# Patient Record
Sex: FEMALE | Race: WHITE | NOT HISPANIC OR LATINO | Employment: OTHER | URBAN - METROPOLITAN AREA
[De-identification: names, ages, dates, MRNs, and addresses within clinical notes are randomized per-mention and may not be internally consistent; named-entity substitution may affect disease eponyms.]

---

## 2017-11-13 DIAGNOSIS — R10.9 AP (ABDOMINAL PAIN): Primary | ICD-10-CM

## 2017-11-21 ENCOUNTER — HOSPITAL ENCOUNTER (OUTPATIENT)
Dept: RADIOLOGY | Facility: HOSPITAL | Age: 27
Discharge: HOME | End: 2017-11-21
Payer: OTHER GOVERNMENT

## 2017-11-21 DIAGNOSIS — R10.9 AP (ABDOMINAL PAIN): ICD-10-CM

## 2017-11-21 PROCEDURE — 25500020 PHARM REV CODE 255

## 2017-11-21 PROCEDURE — 74177 CT ABD & PELVIS W/CONTRAST: CPT | Mod: TC

## 2017-11-21 PROCEDURE — 74177 CT ABD & PELVIS W/CONTRAST: CPT | Mod: 26,,, | Performed by: RADIOLOGY

## 2017-11-21 RX ADMIN — IOHEXOL 15 ML: 300 INJECTION, SOLUTION INTRAVENOUS at 11:11

## 2017-11-21 RX ADMIN — IOHEXOL 75 ML: 350 INJECTION, SOLUTION INTRAVENOUS at 11:11

## 2017-12-15 ENCOUNTER — TELEPHONE (OUTPATIENT)
Dept: OBSTETRICS AND GYNECOLOGY | Facility: CLINIC | Age: 27
End: 2017-12-15

## 2017-12-15 DIAGNOSIS — Z34.90 PREGNANCY, UNSPECIFIED GESTATIONAL AGE: Primary | ICD-10-CM

## 2017-12-15 NOTE — TELEPHONE ENCOUNTER
----- Message from Tiff Posadas sent at 12/15/2017 10:37 AM CST -----  Contact: self    Who Called: self    Date of Positive Preg Test: 12/6    1st day of Last Menstrual Cycle: 11/4    List Any Difficulties: none    What Number to Call Back: [phone]    Pt should expect a return call by the end of the day.

## 2018-01-09 ENCOUNTER — LAB VISIT (OUTPATIENT)
Dept: LAB | Facility: OTHER | Age: 28
End: 2018-01-09
Attending: OBSTETRICS & GYNECOLOGY
Payer: OTHER GOVERNMENT

## 2018-01-09 ENCOUNTER — OFFICE VISIT (OUTPATIENT)
Dept: OBSTETRICS AND GYNECOLOGY | Facility: CLINIC | Age: 28
End: 2018-01-09
Payer: OTHER GOVERNMENT

## 2018-01-09 VITALS
HEIGHT: 67 IN | BODY MASS INDEX: 28.3 KG/M2 | WEIGHT: 180.31 LBS | DIASTOLIC BLOOD PRESSURE: 74 MMHG | SYSTOLIC BLOOD PRESSURE: 106 MMHG

## 2018-01-09 DIAGNOSIS — N91.2 AMENORRHEA: Primary | ICD-10-CM

## 2018-01-09 DIAGNOSIS — Z34.90 PREGNANCY, UNSPECIFIED GESTATIONAL AGE: ICD-10-CM

## 2018-01-09 DIAGNOSIS — N91.1 SECONDARY AMENORRHEA: ICD-10-CM

## 2018-01-09 DIAGNOSIS — Z13.79 ENCOUNTER FOR GENETIC SCREENING FOR DOWN SYNDROME: Primary | ICD-10-CM

## 2018-01-09 DIAGNOSIS — N91.2 AMENORRHEA: ICD-10-CM

## 2018-01-09 DIAGNOSIS — Z36.89 ESTABLISH GESTATIONAL AGE, ULTRASOUND: ICD-10-CM

## 2018-01-09 LAB
ABO + RH BLD: NORMAL
B-HCG UR QL: POSITIVE
BASOPHILS # BLD AUTO: 0.02 K/UL
BASOPHILS NFR BLD: 0.2 %
BLD GP AB SCN CELLS X3 SERPL QL: NORMAL
CTP QC/QA: YES
DIFFERENTIAL METHOD: NORMAL
EOSINOPHIL # BLD AUTO: 0.1 K/UL
EOSINOPHIL NFR BLD: 1 %
ERYTHROCYTE [DISTWIDTH] IN BLOOD BY AUTOMATED COUNT: 13.2 %
HCT VFR BLD AUTO: 38.3 %
HGB BLD-MCNC: 12.9 G/DL
LYMPHOCYTES # BLD AUTO: 1.7 K/UL
LYMPHOCYTES NFR BLD: 18.6 %
MCH RBC QN AUTO: 28.7 PG
MCHC RBC AUTO-ENTMCNC: 33.7 G/DL
MCV RBC AUTO: 85 FL
MONOCYTES # BLD AUTO: 0.7 K/UL
MONOCYTES NFR BLD: 7.3 %
NEUTROPHILS # BLD AUTO: 6.5 K/UL
NEUTROPHILS NFR BLD: 72.7 %
PLATELET # BLD AUTO: 251 K/UL
PMV BLD AUTO: 10.5 FL
RBC # BLD AUTO: 4.49 M/UL
WBC # BLD AUTO: 8.98 K/UL

## 2018-01-09 PROCEDURE — 87491 CHLMYD TRACH DNA AMP PROBE: CPT

## 2018-01-09 PROCEDURE — 86762 RUBELLA ANTIBODY: CPT

## 2018-01-09 PROCEDURE — 36415 COLL VENOUS BLD VENIPUNCTURE: CPT

## 2018-01-09 PROCEDURE — 76817 TRANSVAGINAL US OBSTETRIC: CPT | Mod: 26,S$PBB,, | Performed by: OBSTETRICS & GYNECOLOGY

## 2018-01-09 PROCEDURE — 87340 HEPATITIS B SURFACE AG IA: CPT

## 2018-01-09 PROCEDURE — 99999 PR PBB SHADOW E&M-EST. PATIENT-LVL III: CPT | Mod: PBBFAC,,, | Performed by: OBSTETRICS & GYNECOLOGY

## 2018-01-09 PROCEDURE — 87086 URINE CULTURE/COLONY COUNT: CPT

## 2018-01-09 PROCEDURE — 87077 CULTURE AEROBIC IDENTIFY: CPT

## 2018-01-09 PROCEDURE — 87088 URINE BACTERIA CULTURE: CPT

## 2018-01-09 PROCEDURE — 85025 COMPLETE CBC W/AUTO DIFF WBC: CPT

## 2018-01-09 PROCEDURE — 99204 OFFICE O/P NEW MOD 45 MIN: CPT | Mod: S$PBB,,, | Performed by: OBSTETRICS & GYNECOLOGY

## 2018-01-09 PROCEDURE — 81220 CFTR GENE COM VARIANTS: CPT

## 2018-01-09 PROCEDURE — 87186 SC STD MICRODIL/AGAR DIL: CPT

## 2018-01-09 PROCEDURE — 81025 URINE PREGNANCY TEST: CPT | Mod: PBBFAC | Performed by: OBSTETRICS & GYNECOLOGY

## 2018-01-09 PROCEDURE — 86703 HIV-1/HIV-2 1 RESULT ANTBDY: CPT

## 2018-01-09 PROCEDURE — 76817 TRANSVAGINAL US OBSTETRIC: CPT | Mod: PBBFAC | Performed by: OBSTETRICS & GYNECOLOGY

## 2018-01-09 PROCEDURE — 86901 BLOOD TYPING SEROLOGIC RH(D): CPT

## 2018-01-09 PROCEDURE — 99213 OFFICE O/P EST LOW 20 MIN: CPT | Mod: PBBFAC | Performed by: OBSTETRICS & GYNECOLOGY

## 2018-01-09 PROCEDURE — 86592 SYPHILIS TEST NON-TREP QUAL: CPT

## 2018-01-09 RX ORDER — ONDANSETRON 4 MG/1
TABLET, ORALLY DISINTEGRATING ORAL
COMMUNITY
Start: 2017-10-20

## 2018-01-09 RX ORDER — PRENATAL WITH FERROUS FUM AND FOLIC ACID 3080; 920; 120; 400; 22; 1.84; 3; 20; 10; 1; 12; 200; 27; 25; 2 [IU]/1; [IU]/1; MG/1; [IU]/1; MG/1; MG/1; MG/1; MG/1; MG/1; MG/1; UG/1; MG/1; MG/1; MG/1; MG/1
TABLET ORAL
COMMUNITY
Start: 2017-12-11

## 2018-01-09 NOTE — PROCEDURES
Procedures   Obstetrical ultrasound completed today.  See report in imaging section of [facility].

## 2018-01-09 NOTE — PROGRESS NOTES
Hilary Farias is a 27 y.o. No obstetric history on file., presents today for amenorrhea.    Has not seen any other provider for this possible pregnancy.     HPI: Patient's last menstrual period was 11/04/2017. Prior to LMP, menses were regular. Was previously on Depo Provera prior to conception. She is not currently on any contraception. Denies nausea or vomiting. Has noticed breast tenderness. Denies vaginal bleeding since LMP. Patient was seen in the ED on 11/21/17 for abdominal pain and vomiting- CT abd/pelvis done was negative. At that time she did not know she was pregnant. Symptoms have resolved.     SOCIAL HISTORY: Denies emotional/mental/physical/sexual violence or abuse. Feels safe at home. Patient is [employer]. Very active job. Partner is stationed out of state.     PAP HISTORY: Remote history of abnormal pap but repeat was normal. Last pap 2016 and normal per patient.    Review of patient's allergies indicates:  No Known Allergies  No past medical history on file.  No past surgical history on file.  No past surgical history on file.  OB History   No data available     OB History     No data available        Social History     Social History    Marital status: Single     Spouse name: N/A    Number of children: N/A    Years of education: N/A     Occupational History    Not on file.     Social History Main Topics    Smoking status: Not on file    Smokeless tobacco: Not on file    Alcohol use Not on file    Drug use: Unknown    Sexual activity: Not on file     Other Topics Concern    Not on file     Social History Narrative    No narrative on file     No family history on file.  History   Sexual Activity    Sexual activity: Not on file       GENETIC SCREENING   Patient's age 35 years or older as of estimated date of delivery? no  Nural tube defect (meningomyelocele, spina bifida, or anencephaly)? no  Down syndrome? no  Apolinar-Sachs (Ashkenazi Mormonism, Cajun, Bulgarian Wharton)? no  Canavan  "disease (Ashkenazi Uatsdin)? no  Familial dysautonomia (Ashkenazi Uatsdin)? no  Sickle cell disease or trait ()? no  Hemophilia or other blood disorders? no  Cystic fibrosis? no  Muscular dystrophy? no  Amadeo's chorea? no  Thalassemia (Italian, Greek, Mediterranean, or  background) MCV less than 80? no  Congenital heart defect? no  Mental retardation/autism? no   If Yes, was person tested for Fragile X? no  Other inherited genetic or chromosomal disorder? no  Maternal metabolic disorder (e.g. type 1 diabetes, PKU)? no  Patient or baby's father had a child with birth defects not listed above? no  Recurrent pregnancy loss or a stillbirth: no  Medications (including supplements, vitamins, herbs or OTC drugs)/illicit/recreational drugs/alcohol since last menstrual period? no   If yes, agent(s) and strength/dose: no  List any other genetic risks: no  Comments/counseling: no    INFECTION HISTORY  Live with someone with TB or exposed to TB: no  Patient or partner has history of genital herpes: no  Rash or viral illness since last menstrual period: no  Patient or partner has hepatitis B or C: no  History of STD, gonorrhea, chlamydia, HPV, HIV, syphilis (list all that apply): no  List other infections: no  Additional comments: no    Primary Doctor No     ROS:    Constitutional/Gen: Denies fevers, chills, malaise, or weight loss. Pos fatigue   Psych: Denies depression, anxiety  Eyes: Denies changes in vision or scotomata  Ears, nose, mouth, throat: Denies sinus tenderness, swelling, or dentition problems  CV/vasc: Denies heart palpitations or edema  Resp: Denies SOB or dyspnea  Breasts: Denies mass, nipple discharge, or trauma. Pos breast tenderness.  GI: Denies constipation, diarrhea, or vomiting. Denies nausea.  : Denies vaginal discharge, dysuria or pelvic pain. Denies urinary frequency  MS: Denies weakness, soreness, or changes in ROM    OBJECTIVE:  /74   Ht 5' 7" (1.702 m)   Wt 81.8 kg (180 lb " 5.4 oz)   LMP 11/04/2017   BMI 28.24 kg/m²   Constitutional/Gen: NAD, appears stated age, well groomed  Neck: supple, no masses or enlargement  Head: normocephalic  Skin: warm and dry w/o rash  Lung: normal resp effort, CTAB  Heart: normal HR, RRR   Back: negative CVAT  Breasts: bilaterally--no masses, tenderness, skin changes, or nipple discharge noted  Abdomen: soft, nontender, no masses, and bowel sounds normal, no enlargement  External genitalia: no lesions or discharge, normal hair distribution  Urethral meatus: normal size and location, no lesions or prolapse  Vagina: normal appearance, no lesions, no discharge, no evidence cystocele or rectocele.  Cervix: normal appearance, no discharge, no lesions, negative CMT  Uterus: nontender, mobile, approx 9 week size, contour, and position.   Adnexa: no masses or tenderness  Anus/Perineum: normal appearance, with no lesions or discharge. Internal exam deferred.  Extremities: FROM, with no edema or tenderness.  Neurologic: A&O x 4, non-focal, cranial nerves 2-12 grossly intact  Psych: affect appropriate and without signs of mood, thought or memory difficulty appreciated    UPT pos in office    ASSESSMENT:  27 y.o. female No obstetric history on file. with amenorrhea  Likely at 9w3d via LMP; S=D  Body mass index is 28.24 kg/m².  There is no problem list on file for this patient.      PLAN:  1. Amenorrhea  -- + UPT in office, Patient's last menstrual period was 11/04/2017. --> Estimated Date of Delivery: None noted.  -- Dating US today  -- Anatomical US 19-20 weeks  -- Routine serum and urine prenatal labs today    2. Physical exam today  -- Discussed ASCCP pap guidelines. Last pap 2016 per patient and was normal, pap not indicated today.     3. BMI 28  -- Discussed IOM recommended weight gain of:   Overweight 25-29.9  15-25     4. Discussed nausea and vomiting in pregnancy  -- Education regarding lifestyle and dietary modifications  -- Reviewed use of B6/Unisom prn.  Pt will notify us if no relief/worsening symptoms, will consider alternative therapies prn    5. Pregnancy education and couseling; handouts and booklet provided  -- Oriented to practice and anticipated prenatal course of care and how to contact us  -- Precautions/warning signs reviewed  -- Common complaints of pregnancy  -- Routine prenatal labs including HIV  -- Ultrasounds   -- Nutrition, prepregnant BMI, and recommended weight gain  -- Toxoplasmosis precautions (Cats/Raw Meat)  -- Sexual activity and exercise  -- Environmental/Work hazards  -- Travel  -- Tobacco (Ask, Advise, Assess, Assist, and Arrange), as well as alcohol and drug use  -- Use of any medications (Including supplements, Vitamins, Herbs, or OTC Drugs)  -- Domestic violence screen neg    6. Reviewed genetic testing options. Reviewed available first trimester and/or second  trimester screening options. Reviewed risk of false positive/negative results and recommendation of referral to [facility] in event of a positive result, for NIPT, US, and/or amniocentesis. Reviewed the possible estimated 1 in 300/500 risk of miscarriage with amniocentesis, even with a healthy fetus. Patient desires genetic screening. Will order NT after dating.     RTC x 4 weeks, call or present sooner prn.     Updated Medication List:  Current Outpatient Prescriptions   Medication Sig Dispense Refill    ondansetron (ZOFRAN-ODT) 4 MG TbDL       PRENATAL VITAMIN PLUS LOW IRON 27 mg iron- 1 mg Tab        No current facility-administered medications for this visit.      Leticia Washington MD  1/9/2018

## 2018-01-10 LAB
C TRACH DNA SPEC QL NAA+PROBE: NOT DETECTED
HBV SURFACE AG SERPL QL IA: NEGATIVE
HIV 1+2 AB+HIV1 P24 AG SERPL QL IA: NEGATIVE
N GONORRHOEA DNA SPEC QL NAA+PROBE: NOT DETECTED
RPR SER QL: NORMAL
RUBV IGG SER-ACNC: 30.2 IU/ML
RUBV IGG SER-IMP: REACTIVE

## 2018-01-11 LAB — BACTERIA UR CULT: NORMAL

## 2018-01-12 ENCOUNTER — TELEPHONE (OUTPATIENT)
Dept: OBSTETRICS AND GYNECOLOGY | Facility: CLINIC | Age: 28
End: 2018-01-12

## 2018-01-12 DIAGNOSIS — O99.891 ASYMPTOMATIC BACTERIURIA DURING PREGNANCY: Primary | ICD-10-CM

## 2018-01-12 DIAGNOSIS — R82.71 ASYMPTOMATIC BACTERIURIA DURING PREGNANCY: Primary | ICD-10-CM

## 2018-01-12 NOTE — TELEPHONE ENCOUNTER
Called patient, no answer. Left message requesting pharmacy. Urine culture positive, Rx written for macrobid. No pharmacy on file.

## 2018-01-15 LAB — CFTR MUT ANL BLD/T: NORMAL

## 2018-01-15 RX ORDER — NITROFURANTOIN 25; 75 MG/1; MG/1
100 CAPSULE ORAL 2 TIMES DAILY
Qty: 14 CAPSULE | Refills: 0 | Status: SHIPPED | OUTPATIENT
Start: 2018-01-15 | End: 2018-01-22

## 2018-01-30 ENCOUNTER — LAB VISIT (OUTPATIENT)
Dept: LAB | Facility: OTHER | Age: 28
End: 2018-01-30
Attending: OBSTETRICS & GYNECOLOGY
Payer: OTHER GOVERNMENT

## 2018-01-30 ENCOUNTER — OFFICE VISIT (OUTPATIENT)
Dept: MATERNAL FETAL MEDICINE | Facility: CLINIC | Age: 28
End: 2018-01-30
Payer: OTHER GOVERNMENT

## 2018-01-30 DIAGNOSIS — Z13.79 ENCOUNTER FOR GENETIC SCREENING FOR DOWN SYNDROME: ICD-10-CM

## 2018-01-30 DIAGNOSIS — Z36.82 ENCOUNTER FOR ANTENATAL SCREENING FOR NUCHAL TRANSLUCENCY: ICD-10-CM

## 2018-01-30 DIAGNOSIS — Z36.89 ENCOUNTER FOR FETAL ANATOMIC SURVEY: Primary | ICD-10-CM

## 2018-01-30 PROCEDURE — 76813 OB US NUCHAL MEAS 1 GEST: CPT | Mod: PBBFAC | Performed by: PEDIATRICS

## 2018-01-30 PROCEDURE — 81508 FTL CGEN ABNOR TWO PROTEINS: CPT

## 2018-01-30 PROCEDURE — 76813 OB US NUCHAL MEAS 1 GEST: CPT | Mod: 26,S$PBB,, | Performed by: PEDIATRICS

## 2018-01-30 PROCEDURE — 36415 COLL VENOUS BLD VENIPUNCTURE: CPT

## 2018-01-30 PROCEDURE — 76801 OB US < 14 WKS SINGLE FETUS: CPT | Mod: 26,S$PBB,, | Performed by: PEDIATRICS

## 2018-01-30 PROCEDURE — 76801 OB US < 14 WKS SINGLE FETUS: CPT | Mod: PBBFAC | Performed by: PEDIATRICS

## 2018-01-30 PROCEDURE — 99499 UNLISTED E&M SERVICE: CPT | Mod: S$PBB,,, | Performed by: PEDIATRICS

## 2018-01-30 NOTE — LETTER
January 30, 2018      Leticia Washington MD  [address] [facility] [address]           [facility]  [address]  [facility] [address]  Phone: [phone]          Patient: Hilary Farias   MR Number: [id number]   YOB: 1990   Date of Visit: 1/30/2018       Dear Dr. Leticia Washington:    Thank you for referring Hilary Farias to me for evaluation. Attached you will find relevant portions of my assessment and plan of care.    If you have questions, please do not hesitate to call me. I look forward to following Hilary Farias along with you.    Sincerely,    Karely Hinton MD    Enclosure  CC:  No Recipients    If you would like to receive this communication electronically, please contact [email or web address] or [phone] to request more information on [employer] access.    For providers and/or their staff who would like to refer a patient to [facility], please contact us through our one-stop-shop provider referral line, [facility] , at [phone].    If you feel you have received this communication in error or would no longer like to receive these types of communications, please e-mail [email or web address]

## 2018-01-30 NOTE — PROGRESS NOTES
Indication  ========    First trimester screening.    Maternal Assessment  =================    Weight 81 kg  Weight (lb) 179 lb    Method  ======    Transabdominal ultrasound examination. View: Good view.    Pregnancy  =========    Welsh pregnancy. Number of fetuses: 1.    Dating  ======    LMP on: 11/4/2017  Cycle: regular cycle  GA by LMP 12 w + 3 d  KYLE by LMP: 8/11/2018  Ultrasound examination on: 1/30/2018  GA by U/S based upon: CRL  GA by U/S 13 w + 1 d  KYLE by U/S: 8/6/2018  Assigned: Dating performed on 01/9/2018, based on the LMP  Assigned GA 12 w + 3 d  Assigned KYLE: 8/11/2018    General Evaluation  ==============    Cardiac activity: present.  Placenta: anterior.  Cord vessels: 3 vessel cord.  Amniotic fluid: normal amount.    Fetal Biometry  ============    CRL 68.1 mm 13w 1d Hadlock  NT 1.7 mm   bpm    Fetal Anatomy  ===========    The following structures appear normal:  Cranium. Neck. Thorax. Abdominal wall.    The following structures could not be adequately visualized:  Urogenital tract.    The following structures were visualized:  GI tract. Arms. Legs.    Nasal bone visualized.    Maternal Structures  ===============    Uterus / Cervix  Uterus: Normal  Ovaries / Tubes / Adnexa  Rt ovary: Visualized  Rt ovary D1 2.9 cm  Rt ovary D2 2.2 cm  Rt ovary D3 2.4 cm  Rt ovary mean 2.5 cm  Rt ovary vol 8.0 cm³  Rt ovarian corpus luteum: visualized  Rt ovarian corpus luteum D1 16.0 mm  Rt ovarian corpus luteum D2 15.0 mm  Rt ovarian corpus luteum D3 15.0 mm  Lt ovary: Visualized  Lt ovary D1 2.4 cm  Lt ovary D2 1.5 cm  Lt ovary D3 1.3 cm  Lt ovary mean 1.7 cm  Lt ovary vol 2.4 cm³  Pouch of Jadon / Other Structures  Cul de Sac: Visualized  Free fluid: No free fluid visualized    Impression  =========    Fetal size is consistent with established dating, and normal fetal heart motion is seen.    The nuchal translucency is measured and is WNL at 1.7 mm. Nasal bone is visualized.    A sample of  maternal blood will be sent today for the first portion of the integrated screen.    Recommendation  ==============    Suggest a repeat scan at 18+ weeks for fetal anatomy.    Thank you again for allowing us to participate in the care of your patients. If you have any questions concerning today's consultation, feel free  to contact me or one of my partners. We can be reached at [phone] during normal business hours. If you have a question after normal  business hours, please contact Labor and Delivery at [phone].

## 2018-02-02 LAB
# FETUSES US: NORMAL
AGE AT DELIVERY: 26
B-HCG MOM SERPL: NORMAL
B-HCG SERPL-ACNC: 108.9 IU/ML
FET CRL US.MEAS: 68.1 MM
FET NASAL BONE LENGTH US.MEAS: NORMAL MM
FET NUCHAL FOLD MOM THICKNESS US.MEAS: NORMAL
FET NUCHAL FOLD THICKNESS US.MEAS: 1.7 MM
FET TS 21 RISK FROM MAT AGE: NORMAL
GA (DAYS): 1 D
GA (WEEKS): 13 WK
IDDM PATIENT QL: NORMAL
INTEGRATED SCN PATIENT-IMP: NEGATIVE
PAPP-A MOM SERPL: NORMAL
PAPP-A SERPL-MCNC: 637.9 NG/ML
SEQUENTIAL SCREEN I INTERP.: NORMAL
SMOKING STATUS FTND: NO
TS 18 RISK FETUS: NORMAL
TS 21 RISK FETUS: NORMAL
US DATE: NORMAL

## 2018-02-08 ENCOUNTER — INITIAL PRENATAL (OUTPATIENT)
Dept: OBSTETRICS AND GYNECOLOGY | Facility: CLINIC | Age: 28
End: 2018-02-08
Payer: OTHER GOVERNMENT

## 2018-02-08 VITALS
BODY MASS INDEX: 28.38 KG/M2 | SYSTOLIC BLOOD PRESSURE: 110 MMHG | DIASTOLIC BLOOD PRESSURE: 68 MMHG | WEIGHT: 181.19 LBS

## 2018-02-08 DIAGNOSIS — Z3A.13 13 WEEKS GESTATION OF PREGNANCY: ICD-10-CM

## 2018-02-08 DIAGNOSIS — Z13.79 ENCOUNTER FOR GENETIC SCREENING FOR DOWN SYNDROME: Primary | ICD-10-CM

## 2018-02-08 PROCEDURE — 99212 OFFICE O/P EST SF 10 MIN: CPT | Performed by: OBSTETRICS & GYNECOLOGY

## 2018-02-08 PROCEDURE — 99999 PR PBB SHADOW E&M-EST. PATIENT-LVL II: CPT | Mod: PBBFAC,,, | Performed by: OBSTETRICS & GYNECOLOGY

## 2018-02-08 PROCEDURE — 0500F INITIAL PRENATAL CARE VISIT: CPT | Mod: ,,, | Performed by: OBSTETRICS & GYNECOLOGY

## 2018-02-08 NOTE — PROGRESS NOTES
Doing well, no nausea. Heartburn mostly at night - recommended zantac and tums as well as sitting up after meals. Seq 2 ordered.

## 2018-02-27 ENCOUNTER — LAB VISIT (OUTPATIENT)
Dept: LAB | Facility: OTHER | Age: 28
End: 2018-02-27
Attending: OBSTETRICS & GYNECOLOGY
Payer: OTHER GOVERNMENT

## 2018-02-27 ENCOUNTER — ROUTINE PRENATAL (OUTPATIENT)
Dept: OBSTETRICS AND GYNECOLOGY | Facility: CLINIC | Age: 28
End: 2018-02-27
Payer: OTHER GOVERNMENT

## 2018-02-27 VITALS — BODY MASS INDEX: 28.8 KG/M2 | WEIGHT: 183.88 LBS | SYSTOLIC BLOOD PRESSURE: 114 MMHG | DIASTOLIC BLOOD PRESSURE: 66 MMHG

## 2018-02-27 DIAGNOSIS — Z13.79 ENCOUNTER FOR GENETIC SCREENING FOR DOWN SYNDROME: ICD-10-CM

## 2018-02-27 DIAGNOSIS — O23.42 URINARY TRACT INFECTION IN MOTHER DURING SECOND TRIMESTER OF PREGNANCY: Primary | ICD-10-CM

## 2018-02-27 DIAGNOSIS — Z3A.16 16 WEEKS GESTATION OF PREGNANCY: ICD-10-CM

## 2018-02-27 PROCEDURE — 99999 PR PBB SHADOW E&M-EST. PATIENT-LVL II: CPT | Mod: PBBFAC,,, | Performed by: OBSTETRICS & GYNECOLOGY

## 2018-02-27 PROCEDURE — 36415 COLL VENOUS BLD VENIPUNCTURE: CPT

## 2018-02-27 PROCEDURE — 81511 FTL CGEN ABNOR FOUR ANAL: CPT

## 2018-02-27 PROCEDURE — 0502F SUBSEQUENT PRENATAL CARE: CPT | Mod: ,,, | Performed by: OBSTETRICS & GYNECOLOGY

## 2018-02-27 PROCEDURE — 99212 OFFICE O/P EST SF 10 MIN: CPT | Mod: PBBFAC | Performed by: OBSTETRICS & GYNECOLOGY

## 2018-02-27 PROCEDURE — 87086 URINE CULTURE/COLONY COUNT: CPT

## 2018-02-27 NOTE — PROGRESS NOTES
Feeling well. Heartburn improved - sleeping more on her side which is helping. Got engaged since last visit. Seq 2 today. Repeat UCx today after treatment. F/U 4 weeks.

## 2018-02-28 LAB — BACTERIA UR CULT: NO GROWTH

## 2018-03-01 LAB
# FETUSES US: NORMAL
AFP MOM SERPL: 0.66
AFP SERPL-MCNC: 23.3 NG/ML
AGE AT DELIVERY: 26
B-HCG MOM SERPL: 1.08
B-HCG SERPL-ACNC: 26.3 IU/ML
COLLECT DATE BLD: NORMAL
COLLECT DATE: NORMAL
FET NASAL BONE LENGTH US.MEAS: NORMAL MM
FET NUCHAL FOLD MOM THICKNESS US.MEAS: 1.19
FET NUCHAL FOLD THICKNESS US.MEAS: 1.7 MM
FET TS 21 RISK FROM MAT AGE: NORMAL
GA (DAYS): 1 D
GA (WEEKS): 17 WK
GA METHOD: NORMAL
GEST. AGE (DAYS) 2ND SAMPLE (SS2): 1
GEST. AGE (WKS) 1ST SAMPLE (SS2): 13
IDDM PATIENT QL: NORMAL
INHIBIN A MOM SERPL: 0.99
INHIBIN A SERPL-MCNC: 138.6 PG/ML
INTEGRATED SCN PATIENT-IMP: NEGATIVE
PAPP-A MOM SERPL: 0.68
PAPP-A SERPL-MCNC: 637.9 NG/ML
SEQUENTIAL SCREEN PART 2 INTERP: NORMAL
TS 18 RISK FETUS: NORMAL
TS 21 RISK FETUS: NORMAL
U ESTRIOL MOM SERPL: 1.56
U ESTRIOL SERPL-MCNC: 1.53 NG/ML

## 2018-03-19 ENCOUNTER — OFFICE VISIT (OUTPATIENT)
Dept: MATERNAL FETAL MEDICINE | Facility: CLINIC | Age: 28
End: 2018-03-19
Payer: OTHER GOVERNMENT

## 2018-03-19 DIAGNOSIS — Z36.89 ENCOUNTER FOR FETAL ANATOMIC SURVEY: ICD-10-CM

## 2018-03-19 PROCEDURE — 76805 OB US >/= 14 WKS SNGL FETUS: CPT | Mod: 26,S$PBB,, | Performed by: OBSTETRICS & GYNECOLOGY

## 2018-03-19 PROCEDURE — 99499 UNLISTED E&M SERVICE: CPT | Mod: S$PBB,,, | Performed by: OBSTETRICS & GYNECOLOGY

## 2018-03-19 PROCEDURE — 76805 OB US >/= 14 WKS SNGL FETUS: CPT | Mod: PBBFAC | Performed by: OBSTETRICS & GYNECOLOGY

## 2018-03-27 ENCOUNTER — ROUTINE PRENATAL (OUTPATIENT)
Dept: OBSTETRICS AND GYNECOLOGY | Facility: CLINIC | Age: 28
End: 2018-03-27
Payer: OTHER GOVERNMENT

## 2018-03-27 VITALS
DIASTOLIC BLOOD PRESSURE: 70 MMHG | WEIGHT: 191.38 LBS | SYSTOLIC BLOOD PRESSURE: 110 MMHG | BODY MASS INDEX: 29.97 KG/M2

## 2018-03-27 DIAGNOSIS — Z34.92 SECOND TRIMESTER PREGNANCY: ICD-10-CM

## 2018-03-27 DIAGNOSIS — Z3A.20 20 WEEKS GESTATION OF PREGNANCY: Primary | ICD-10-CM

## 2018-03-27 PROCEDURE — 99212 OFFICE O/P EST SF 10 MIN: CPT | Mod: PBBFAC | Performed by: OBSTETRICS & GYNECOLOGY

## 2018-03-27 PROCEDURE — 99999 PR PBB SHADOW E&M-EST. PATIENT-LVL II: CPT | Mod: PBBFAC,,, | Performed by: OBSTETRICS & GYNECOLOGY

## 2018-03-27 PROCEDURE — 0502F SUBSEQUENT PRENATAL CARE: CPT | Mod: ,,, | Performed by: OBSTETRICS & GYNECOLOGY

## 2018-03-27 NOTE — PROGRESS NOTES
Doing well, anatomy normal. Having a lot of stress at work - may need to file for separation from  after delivery. OB glucose next visit. F/U 4 weeks.

## 2018-04-12 ENCOUNTER — TELEPHONE (OUTPATIENT)
Dept: OBSTETRICS AND GYNECOLOGY | Facility: CLINIC | Age: 28
End: 2018-04-12

## 2018-04-12 NOTE — TELEPHONE ENCOUNTER
----- Message from Diana Brian sent at 4/12/2018  1:38 PM CDT -----  Contact: self  Pt needing to reschedule her appt for 04/24/18, she can be reached at [phone].

## 2018-04-20 ENCOUNTER — ROUTINE PRENATAL (OUTPATIENT)
Dept: OBSTETRICS AND GYNECOLOGY | Facility: CLINIC | Age: 28
End: 2018-04-20
Payer: OTHER GOVERNMENT

## 2018-04-20 ENCOUNTER — LAB VISIT (OUTPATIENT)
Dept: LAB | Facility: OTHER | Age: 28
End: 2018-04-20
Attending: OBSTETRICS & GYNECOLOGY
Payer: OTHER GOVERNMENT

## 2018-04-20 VITALS
BODY MASS INDEX: 30.32 KG/M2 | WEIGHT: 193.56 LBS | SYSTOLIC BLOOD PRESSURE: 110 MMHG | DIASTOLIC BLOOD PRESSURE: 74 MMHG

## 2018-04-20 DIAGNOSIS — Z34.92 SECOND TRIMESTER PREGNANCY: ICD-10-CM

## 2018-04-20 DIAGNOSIS — Z3A.23 23 WEEKS GESTATION OF PREGNANCY: Primary | ICD-10-CM

## 2018-04-20 LAB
BASOPHILS # BLD AUTO: 0.02 K/UL
BASOPHILS NFR BLD: 0.2 %
DIFFERENTIAL METHOD: ABNORMAL
EOSINOPHIL # BLD AUTO: 0.1 K/UL
EOSINOPHIL NFR BLD: 1.2 %
ERYTHROCYTE [DISTWIDTH] IN BLOOD BY AUTOMATED COUNT: 13.3 %
GLUCOSE SERPL-MCNC: 92 MG/DL
HCT VFR BLD AUTO: 35.9 %
HGB BLD-MCNC: 11.8 G/DL
LYMPHOCYTES # BLD AUTO: 1.6 K/UL
LYMPHOCYTES NFR BLD: 15.1 %
MCH RBC QN AUTO: 28.9 PG
MCHC RBC AUTO-ENTMCNC: 32.9 G/DL
MCV RBC AUTO: 88 FL
MONOCYTES # BLD AUTO: 0.7 K/UL
MONOCYTES NFR BLD: 6.1 %
NEUTROPHILS # BLD AUTO: 8.4 K/UL
NEUTROPHILS NFR BLD: 77 %
PLATELET # BLD AUTO: 263 K/UL
PMV BLD AUTO: 10.2 FL
RBC # BLD AUTO: 4.09 M/UL
WBC # BLD AUTO: 10.84 K/UL

## 2018-04-20 PROCEDURE — 99212 OFFICE O/P EST SF 10 MIN: CPT | Mod: PBBFAC | Performed by: OBSTETRICS & GYNECOLOGY

## 2018-04-20 PROCEDURE — 82950 GLUCOSE TEST: CPT

## 2018-04-20 PROCEDURE — 99999 PR PBB SHADOW E&M-EST. PATIENT-LVL II: CPT | Mod: PBBFAC,,, | Performed by: OBSTETRICS & GYNECOLOGY

## 2018-04-20 PROCEDURE — 36415 COLL VENOUS BLD VENIPUNCTURE: CPT

## 2018-04-20 PROCEDURE — 0502F SUBSEQUENT PRENATAL CARE: CPT | Mod: ,,, | Performed by: OBSTETRICS & GYNECOLOGY

## 2018-04-20 PROCEDURE — 85025 COMPLETE CBC W/AUTO DIFF WBC: CPT

## 2018-04-20 NOTE — PROGRESS NOTES
Doing well. OB glucose today. May be moving with , discussed records release prior to moving. F/U 4 weeks with Tdap.

## 2018-05-22 ENCOUNTER — ROUTINE PRENATAL (OUTPATIENT)
Dept: OBSTETRICS AND GYNECOLOGY | Facility: CLINIC | Age: 28
End: 2018-05-22
Payer: OTHER GOVERNMENT

## 2018-05-22 VITALS — SYSTOLIC BLOOD PRESSURE: 118 MMHG | WEIGHT: 198 LBS | BODY MASS INDEX: 31.01 KG/M2 | DIASTOLIC BLOOD PRESSURE: 80 MMHG

## 2018-05-22 DIAGNOSIS — Z3A.28 28 WEEKS GESTATION OF PREGNANCY: Primary | ICD-10-CM

## 2018-05-22 PROCEDURE — 99212 OFFICE O/P EST SF 10 MIN: CPT | Mod: PBBFAC,25 | Performed by: OBSTETRICS & GYNECOLOGY

## 2018-05-22 PROCEDURE — 0502F SUBSEQUENT PRENATAL CARE: CPT | Mod: ,,, | Performed by: OBSTETRICS & GYNECOLOGY

## 2018-05-22 PROCEDURE — 90471 IMMUNIZATION ADMIN: CPT | Mod: PBBFAC

## 2018-05-22 PROCEDURE — 99999 PR PBB SHADOW E&M-EST. PATIENT-LVL I: CPT | Mod: PBBFAC,,,

## 2018-05-22 PROCEDURE — 99999 PR PBB SHADOW E&M-EST. PATIENT-LVL II: CPT | Mod: PBBFAC,,, | Performed by: OBSTETRICS & GYNECOLOGY

## 2018-05-22 PROCEDURE — 99211 OFF/OP EST MAY X REQ PHY/QHP: CPT | Mod: PBBFAC,27

## 2018-05-22 NOTE — PROGRESS NOTES
Good FM. Denies VB, LOF, CTX. Labor, ROM and bleeding precautions. Had some BH contractions last week. Tdap today. F/U 3 weeks.

## 2018-05-22 NOTE — PROGRESS NOTES
Here for TDAP injection. Patient without complaint of pain at this time ,Injection given. Tolerated well. No pain noted after injection.  Advised to wait in lobby 15 minutes and report any adverse reactions.         Site:   LD    Baby Friendly Handout Given to Patient

## 2018-06-12 ENCOUNTER — ROUTINE PRENATAL (OUTPATIENT)
Dept: OBSTETRICS AND GYNECOLOGY | Facility: CLINIC | Age: 28
End: 2018-06-12
Payer: OTHER GOVERNMENT

## 2018-06-12 VITALS
DIASTOLIC BLOOD PRESSURE: 84 MMHG | WEIGHT: 204.38 LBS | SYSTOLIC BLOOD PRESSURE: 108 MMHG | BODY MASS INDEX: 32.01 KG/M2

## 2018-06-12 DIAGNOSIS — Z34.03 ENCOUNTER FOR SUPERVISION OF NORMAL FIRST PREGNANCY IN THIRD TRIMESTER: ICD-10-CM

## 2018-06-12 PROBLEM — Z34.93 ENCOUNTER FOR SUPERVISION OF NORMAL PREGNANCY IN THIRD TRIMESTER: Status: ACTIVE | Noted: 2018-06-12

## 2018-06-12 PROCEDURE — 99212 OFFICE O/P EST SF 10 MIN: CPT | Mod: PBBFAC | Performed by: OBSTETRICS & GYNECOLOGY

## 2018-06-12 PROCEDURE — 99999 PR PBB SHADOW E&M-EST. PATIENT-LVL II: CPT | Mod: PBBFAC,,, | Performed by: OBSTETRICS & GYNECOLOGY

## 2018-06-12 PROCEDURE — 0502F SUBSEQUENT PRENATAL CARE: CPT | Mod: ,,, | Performed by: OBSTETRICS & GYNECOLOGY

## 2018-06-12 NOTE — PROGRESS NOTES
Good FM. Denies VB, LOF, CTX. Labor, ROM and bleeding precautions. Moving July 6 to be stationed closer to home for delivery. Breast pump ordered. F/U 2 weeks.

## 2018-06-26 ENCOUNTER — ROUTINE PRENATAL (OUTPATIENT)
Dept: OBSTETRICS AND GYNECOLOGY | Facility: CLINIC | Age: 28
End: 2018-06-26
Payer: OTHER GOVERNMENT

## 2018-06-26 VITALS — WEIGHT: 209.44 LBS | SYSTOLIC BLOOD PRESSURE: 112 MMHG | DIASTOLIC BLOOD PRESSURE: 72 MMHG | BODY MASS INDEX: 32.8 KG/M2

## 2018-06-26 DIAGNOSIS — Z34.03 ENCOUNTER FOR SUPERVISION OF NORMAL FIRST PREGNANCY IN THIRD TRIMESTER: Primary | ICD-10-CM

## 2018-06-26 DIAGNOSIS — Z3A.33 33 WEEKS GESTATION OF PREGNANCY: ICD-10-CM

## 2018-06-26 PROCEDURE — 99212 OFFICE O/P EST SF 10 MIN: CPT | Mod: PBBFAC | Performed by: OBSTETRICS & GYNECOLOGY

## 2018-06-26 PROCEDURE — 99999 PR PBB SHADOW E&M-EST. PATIENT-LVL II: CPT | Mod: PBBFAC,,, | Performed by: OBSTETRICS & GYNECOLOGY

## 2018-06-26 PROCEDURE — 59426 ANTEPARTUM CARE ONLY: CPT | Mod: ,,, | Performed by: OBSTETRICS & GYNECOLOGY
